# Patient Record
Sex: MALE | Race: WHITE | NOT HISPANIC OR LATINO | Employment: FULL TIME | URBAN - METROPOLITAN AREA
[De-identification: names, ages, dates, MRNs, and addresses within clinical notes are randomized per-mention and may not be internally consistent; named-entity substitution may affect disease eponyms.]

---

## 2023-06-25 PROBLEM — R04.0 EPISTAXIS: Status: ACTIVE | Noted: 2023-06-25

## 2023-06-26 PROBLEM — F10.939 ALCOHOL WITHDRAWAL: Status: ACTIVE | Noted: 2023-06-26

## 2023-06-26 PROBLEM — D69.6 THROMBOCYTOPENIA: Status: ACTIVE | Noted: 2023-06-26

## 2023-06-26 PROBLEM — R79.1 ELEVATED INR: Status: ACTIVE | Noted: 2023-06-26

## 2023-06-26 PROBLEM — D62 ACUTE BLOOD LOSS ANEMIA: Status: ACTIVE | Noted: 2023-06-26

## 2023-06-29 PROBLEM — K74.60 HEPATIC CIRRHOSIS: Status: ACTIVE | Noted: 2023-06-29

## 2023-07-24 ENCOUNTER — TREATMENT (OUTPATIENT)
Dept: PHYSICAL THERAPY | Facility: CLINIC | Age: 53
End: 2023-07-24
Payer: COMMERCIAL

## 2023-07-24 DIAGNOSIS — G89.29 CHRONIC RIGHT-SIDED LOW BACK PAIN WITH RIGHT-SIDED SCIATICA: Primary | ICD-10-CM

## 2023-07-24 DIAGNOSIS — M54.41 CHRONIC RIGHT-SIDED LOW BACK PAIN WITH RIGHT-SIDED SCIATICA: Primary | ICD-10-CM

## 2023-07-24 PROCEDURE — 97110 THERAPEUTIC EXERCISES: CPT | Performed by: PHYSICAL THERAPIST

## 2023-07-24 NOTE — PROGRESS NOTES
Physical Therapy Daily Treatment Note                  [address] [facility] [address]  [place] [address]      Patient: Karthik Otto   : 1970  Diagnosis/ICD-10 Code:  Chronic right-sided low back pain with right-sided sciatica [M54.41, G89.29]  Referring practitioner: Akila Rosario DO  Date of Initial Visit: Type: THERAPY  Noted: 2023  Today's Date: 2023  Patient seen for 2 sessions             Subjective   Karthik Otto reports: no changes in symptoms yet but doing the stretches.     Objective   See Exercise, Manual, and Modality Logs for complete treatment.       Assessment/Plan  Progressed to prone press ups with positive results. Pt noted a decrease in low back stiffness/discomfort. He will continue these daily to add to HEP.  Progress per Plan of Care and Progress strengthening /stabilization /functional activity           Timed:  Manual Therapy:         mins  89547;  Therapeutic Exercise:    30     mins  72105;     Neuromuscular Krupa:        mins  47643;    Therapeutic Activity:          mins  48469;     Gait Training:           mins  54284;     Ultrasound:          mins  43454;    Electrical Stimulation:         mins  64057;  Iontophoresis          mins  28677    Untimed:  Electrical Stimulation:         mins  35957 ( );  Mechanical Traction:         mins  79396;   Fluidotherapy          mins  38127    Timed Treatment:   30   mins   Total Treatment:     30   mins        Jade Sousa PTA  Physical Therapist Assistant

## 2023-07-26 ENCOUNTER — PATIENT MESSAGE (OUTPATIENT)
Dept: FAMILY MEDICINE CLINIC | Facility: CLINIC | Age: 53
End: 2023-07-26
Payer: COMMERCIAL

## 2023-07-26 ENCOUNTER — TREATMENT (OUTPATIENT)
Dept: PHYSICAL THERAPY | Facility: CLINIC | Age: 53
End: 2023-07-26
Payer: COMMERCIAL

## 2023-07-26 DIAGNOSIS — Z87.898 HISTORY OF HEAVY ALCOHOL CONSUMPTION: ICD-10-CM

## 2023-07-26 DIAGNOSIS — M54.41 CHRONIC RIGHT-SIDED LOW BACK PAIN WITH RIGHT-SIDED SCIATICA: Primary | ICD-10-CM

## 2023-07-26 DIAGNOSIS — I10 PRIMARY HYPERTENSION: Primary | ICD-10-CM

## 2023-07-26 DIAGNOSIS — M54.2 PAIN, NECK: ICD-10-CM

## 2023-07-26 DIAGNOSIS — G89.29 CHRONIC RIGHT-SIDED LOW BACK PAIN WITH RIGHT-SIDED SCIATICA: Primary | ICD-10-CM

## 2023-07-26 PROCEDURE — 97110 THERAPEUTIC EXERCISES: CPT | Performed by: PHYSICAL THERAPIST

## 2023-07-26 PROCEDURE — 97530 THERAPEUTIC ACTIVITIES: CPT | Performed by: PHYSICAL THERAPIST

## 2023-07-26 NOTE — PROGRESS NOTES
Physical Therapy Daily Treatment Note                  [address] [facility] [address]  [place] [address]      Patient: Karthik Otto   : 1970  Diagnosis/ICD-10 Code:  Chronic right-sided low back pain with right-sided sciatica [M54.41, G89.29]  Referring practitioner: Akila Rosario DO  Date of Initial Visit: Type: THERAPY  Noted: 2023  Today's Date: 2023  Patient seen for 3 sessions             Subjective   Karthik Otto reports: have some soreness in the R side of shoulder and R buttocks today.   1/10 pain pre treatment  0/10 pain post treatment    Objective   See Exercise, Manual, and Modality Logs for complete treatment.       Assessment/Plan  Able to reach pain free in the neck and back post treatment. Updated HEP to reflect progressions to continue for flexibility and strength.     Access Code: [email or web address]  URL: [email or web address]  Date: 2023  Prepared by: Jade Sousa    Exercises  - Cervical Extension AROM with Strap  - 1 x daily - 7 x weekly - 1 sets - 10 reps  - Cervical Retraction at Wall  - 1 x daily - 7 x weekly - 2 sets - 10 reps  - Gentle Levator Scapulae Stretch (Mirrored)  - 1 x daily - 7 x weekly - 3 sets - 15 sec hold  - Seated Thoracic Lumbar Extension with Pectoralis Stretch  - 1 x daily - 7 x weekly - 1 sets - 10 reps  - Seated Hamstring Stretch  - 1 x daily - 7 x weekly - 5 sets - 15 sec hold  - Seated Figure 4 Piriformis Stretch  - 1 x daily - 7 x weekly - 3 sets - 30 sec hold  - Supine Pelvic Tilt  - 1 x daily - 7 x weekly - 1 sets - 20 reps  - Prone Press Up  - 1 x daily - 7 x weekly - 1 sets - 10 reps  - Bridge  - 1 x daily - 7 x weekly - 2 sets - 10 reps  Progress per Plan of Care and Progress strengthening /stabilization /functional activity           Timed:  Manual Therapy:         mins  07343;  Therapeutic Exercise:    16     mins  93501;     Neuromuscular Krupa:        mins  75779;    Therapeutic Activity:     21      mins  79767;     Gait Training:           mins  33950;     Ultrasound:          mins  48224;    Electrical Stimulation:         mins  91116;  Iontophoresis          mins  59013    Untimed:  Electrical Stimulation:         mins  63807 ( );  Mechanical Traction:         mins  27305;   Fluidotherapy          mins  67926    Timed Treatment:   37   mins   Total Treatment:     37   mins        Jade Sousa PTA  Physical Therapist Assistant

## 2023-07-27 RX ORDER — LANOLIN ALCOHOL/MO/W.PET/CERES
100 CREAM (GRAM) TOPICAL DAILY
Qty: 30 TABLET | Refills: 0 | OUTPATIENT
Start: 2023-07-27

## 2023-07-27 RX ORDER — MULTIPLE VITAMINS W/ MINERALS TAB 9MG-400MCG
1 TAB ORAL DAILY
Qty: 30 EACH | Refills: 11 | Status: SHIPPED | OUTPATIENT
Start: 2023-07-27

## 2023-07-27 RX ORDER — FOLIC ACID 1 MG/1
1 TABLET ORAL DAILY
Qty: 30 TABLET | Refills: 0 | OUTPATIENT
Start: 2023-07-27

## 2023-07-27 RX ORDER — AMLODIPINE BESYLATE 5 MG/1
5 TABLET ORAL DAILY
Qty: 30 TABLET | Refills: 1 | Status: SHIPPED | OUTPATIENT
Start: 2023-07-27

## 2023-07-27 NOTE — TELEPHONE ENCOUNTER
Rx Refill Note  Requested Prescriptions     Pending Prescriptions Disp Refills    folic acid (FOLVITE) 1 MG tablet 30 tablet 0     Sig: Take 1 tablet by mouth Daily.    multivitamin with minerals tablet tablet 30 each 0     Sig: Take 1 tablet by mouth Daily.    thiamine (VITAMIN B1) 100 MG tablet 30 tablet 0     Sig: Take 1 tablet by mouth Daily.    amLODIPine (NORVASC) 5 MG tablet 30 tablet 1     Sig: Take 1 tablet by mouth Daily.      Last office visit with prescribing clinician: 7/3/2023   Next office visit with prescribing clinician: 1/15/2024   Estela Nair MA  07/27/23, 07:37 EDT    Last fill: 06/30/2023

## 2023-07-31 ENCOUNTER — TREATMENT (OUTPATIENT)
Dept: PHYSICAL THERAPY | Facility: CLINIC | Age: 53
End: 2023-07-31
Payer: COMMERCIAL

## 2023-07-31 DIAGNOSIS — M54.41 CHRONIC RIGHT-SIDED LOW BACK PAIN WITH RIGHT-SIDED SCIATICA: Primary | ICD-10-CM

## 2023-07-31 DIAGNOSIS — G89.29 CHRONIC RIGHT-SIDED LOW BACK PAIN WITH RIGHT-SIDED SCIATICA: Primary | ICD-10-CM

## 2023-07-31 DIAGNOSIS — M54.2 PAIN, NECK: ICD-10-CM

## 2023-08-02 ENCOUNTER — TREATMENT (OUTPATIENT)
Dept: PHYSICAL THERAPY | Facility: CLINIC | Age: 53
End: 2023-08-02
Payer: COMMERCIAL

## 2023-08-02 DIAGNOSIS — M54.41 CHRONIC RIGHT-SIDED LOW BACK PAIN WITH RIGHT-SIDED SCIATICA: Primary | ICD-10-CM

## 2023-08-02 DIAGNOSIS — G89.29 CHRONIC RIGHT-SIDED LOW BACK PAIN WITH RIGHT-SIDED SCIATICA: Primary | ICD-10-CM

## 2023-08-02 DIAGNOSIS — M54.2 PAIN, NECK: ICD-10-CM

## 2023-08-02 PROCEDURE — 97112 NEUROMUSCULAR REEDUCATION: CPT | Performed by: PHYSICAL THERAPIST

## 2023-08-02 PROCEDURE — 97110 THERAPEUTIC EXERCISES: CPT | Performed by: PHYSICAL THERAPIST

## 2023-08-07 ENCOUNTER — TREATMENT (OUTPATIENT)
Dept: PHYSICAL THERAPY | Facility: CLINIC | Age: 53
End: 2023-08-07
Payer: COMMERCIAL

## 2023-08-07 DIAGNOSIS — M54.2 PAIN, NECK: ICD-10-CM

## 2023-08-07 DIAGNOSIS — G89.29 CHRONIC RIGHT-SIDED LOW BACK PAIN WITH RIGHT-SIDED SCIATICA: Primary | ICD-10-CM

## 2023-08-07 DIAGNOSIS — M54.41 CHRONIC RIGHT-SIDED LOW BACK PAIN WITH RIGHT-SIDED SCIATICA: Primary | ICD-10-CM

## 2023-08-07 PROCEDURE — 97110 THERAPEUTIC EXERCISES: CPT | Performed by: PHYSICAL THERAPIST

## 2023-08-07 PROCEDURE — 97530 THERAPEUTIC ACTIVITIES: CPT | Performed by: PHYSICAL THERAPIST

## 2023-08-07 NOTE — PROGRESS NOTES
Physical Therapy Daily Treatment Note                  [address] [facility] [address]  [place] [address]      Patient: Karthik Otto   : 1970  Diagnosis/ICD-10 Code:  Chronic right-sided low back pain with right-sided sciatica [M54.41, G89.29]  Referring practitioner: Akila Rosario DO  Date of Initial Visit: Type: THERAPY  Noted: 2023  Today's Date: 2023  Patient seen for 6 sessions             Subjective   Karthik Otto reports: able to do a lot of yard work pulling bushes over 2 acres this weekend. Did not have much discomfort during the activity but afterwards got sore in the muscles and tightened up causing some discomfort in the right side of the neck. 90-95% back to normal with therapy.     Objective   See Exercise, Manual, and Modality Logs for complete treatment.       Assessment/Plan  Endurance of stabilizers are the focus in treatment with thoracic mobility. Pt was able to complete puling/pushing/lifting activities pain free during activities, but had a sore/stiff neck after for a little bit. Expect the discomfort post heavy activities to decrease with strengthening. Updated HEP to work more on strength/thoracic mobility.     Access Code: [email or web address]  URL: [email or web address]  Date: 2023  Prepared by: Jade Sousa    Exercises  - Seated Thoracic Lumbar Extension with Pectoralis Stretch  - 1 x daily - 7 x weekly - 1 sets - 15 reps  - Sidelying Thoracic Rotation with Open Book  - 1 x daily - 7 x weekly - 1 sets - 10 reps  - Prone Shoulder Horizontal Abduction  - 1 x daily - 7 x weekly - 2 sets - 10 reps  - Prone Single Arm Shoulder Y  - 1 x daily - 7 x weekly - 2 sets - 10 reps  - Prone Shoulder Extension - Single Arm  - 1 x daily - 7 x weekly - 2 sets - 10 reps  - Quadruped Cervical Retraction  - 1 x daily - 7 x weekly - 2 sets - 5 reps  Progress per Plan of Care and Progress strengthening /stabilization /functional activity            Timed:  Manual Therapy:         mins  18081;  Therapeutic Exercise:    30     mins  36095;     Neuromuscular Krupa:        mins  00299;    Therapeutic Activity:     15     mins  98375;     Gait Training:           mins  46323;     Ultrasound:          mins  75405;    Electrical Stimulation:         mins  35052;  Iontophoresis          mins  80504    Untimed:  Electrical Stimulation:         mins  24047 ( );  Mechanical Traction:         mins  01768;   Fluidotherapy          mins  49300    Timed Treatment:   45   mins   Total Treatment:     45   mins        Jade Sousa PTA  Physical Therapist Assistant

## 2023-08-09 ENCOUNTER — TELEPHONE (OUTPATIENT)
Dept: PHYSICAL THERAPY | Facility: CLINIC | Age: 53
End: 2023-08-09

## 2023-08-14 ENCOUNTER — TREATMENT (OUTPATIENT)
Dept: PHYSICAL THERAPY | Facility: CLINIC | Age: 53
End: 2023-08-14
Payer: COMMERCIAL

## 2023-08-14 DIAGNOSIS — M54.41 CHRONIC RIGHT-SIDED LOW BACK PAIN WITH RIGHT-SIDED SCIATICA: Primary | ICD-10-CM

## 2023-08-14 DIAGNOSIS — M54.2 PAIN, NECK: ICD-10-CM

## 2023-08-14 DIAGNOSIS — G89.29 CHRONIC RIGHT-SIDED LOW BACK PAIN WITH RIGHT-SIDED SCIATICA: Primary | ICD-10-CM

## 2023-08-14 PROCEDURE — 97110 THERAPEUTIC EXERCISES: CPT | Performed by: PHYSICAL THERAPIST

## 2023-08-14 NOTE — PROGRESS NOTES
Physical Therapy Daily Treatment Note                  [address] [facility] [address]  [place] [address]      Patient: Karthik Otto   : 1970  Diagnosis/ICD-10 Code:  Chronic right-sided low back pain with right-sided sciatica [M54.41, G89.29]  Referring practitioner: Akila Rosario DO  Date of Initial Visit: Type: THERAPY  Noted: 2023  Today's Date: 2023  Patient seen for 7 sessions             Subjective   Karthik Otto reports: did 12 hours of yard work yesterday and the neck/shoulder di pretty good. He is sore today but muscular sore.     Objective   See Exercise, Manual, and Modality Logs for complete treatment.       Assessment/Plan  Pt was able to complete lifting/pushing/pulling activities in the yard with no negative effects. Will updated HEP next visit for progressions.   Progress per Plan of Care and Progress strengthening /stabilization /functional activity           Timed:  Manual Therapy:         mins  80759;  Therapeutic Exercise:    33     mins  57171;     Neuromuscular Krupa:        mins  83150;    Therapeutic Activity:          mins  19829;     Gait Training:           mins  12586;     Ultrasound:          mins  80450;    Electrical Stimulation:         mins  52459;  Iontophoresis          mins  74579    Untimed:  Electrical Stimulation:         mins  77028 ( );  Mechanical Traction:         mins  17277;   Fluidotherapy          mins  24443    Timed Treatment:   33   mins   Total Treatment:     33   mins        Jade Sousa PTA  Physical Therapist Assistant

## 2023-08-18 DIAGNOSIS — I10 PRIMARY HYPERTENSION: ICD-10-CM

## 2023-08-18 RX ORDER — AMLODIPINE BESYLATE 5 MG/1
TABLET ORAL
Qty: 30 TABLET | Refills: 1 | Status: SHIPPED | OUTPATIENT
Start: 2023-08-18

## 2023-08-18 NOTE — TELEPHONE ENCOUNTER
Rx Refill Note  Requested Prescriptions     Pending Prescriptions Disp Refills    amLODIPine (NORVASC) 5 MG tablet [Pharmacy Med Name: AMLODIPINE BESYLATE 5 MG TAB] 30 tablet 1     Sig: TAKE 1 TABLET BY MOUTH EVERY DAY      Last office visit with prescribing clinician: 7/3/2023   Last telemedicine visit with prescribing clinician: Visit date not found   Next office visit with prescribing clinician: 1/15/2024                         Would you like a call back once the refill request has been completed: [] Yes [] No    If the office needs to give you a call back, can they leave a voicemail: [] Yes [] No    Sami Duke MA  08/18/23, 12:50 EDT

## 2023-08-28 DIAGNOSIS — I10 PRIMARY HYPERTENSION: ICD-10-CM

## 2023-08-28 RX ORDER — AMLODIPINE BESYLATE 5 MG/1
5 TABLET ORAL DAILY
Qty: 30 TABLET | Refills: 1 | OUTPATIENT
Start: 2023-08-28

## 2023-08-28 NOTE — TELEPHONE ENCOUNTER
Caller: Karthik Otto    Relationship: Self    Best call back number: [phone]    Requested Prescriptions:   Requested Prescriptions     Pending Prescriptions Disp Refills    amLODIPine (NORVASC) 5 MG tablet 30 tablet 1     Sig: Take 1 tablet by mouth Daily.        Pharmacy where request should be sent:    [facility] [phone]  Last office visit with prescribing clinician: 7/3/2023   Last telemedicine visit with prescribing clinician: Visit date not found   Next office visit with prescribing clinician: 1/15/2024     Additional details provided by patient: PATIENT IS OUT OF MEDICATION AND INSURANCE WILL ONLY PAY FOR A 90 DAY SUPPLY. PLEASE SEND IN A 90 DAY SUPPLY.     Does the patient have less than a 3 day supply:  [x] Yes  [] No    Would you like a call back once the refill request has been completed: [x] Yes [] No    If the office needs to give you a call back, can they leave a voicemail: [x] Yes [] No    Alexys Ramos Rep   08/28/23 13:31 EDT

## 2023-08-30 DIAGNOSIS — I10 PRIMARY HYPERTENSION: ICD-10-CM

## 2023-08-31 ENCOUNTER — PATIENT MESSAGE (OUTPATIENT)
Dept: FAMILY MEDICINE CLINIC | Facility: CLINIC | Age: 53
End: 2023-08-31
Payer: COMMERCIAL

## 2023-08-31 DIAGNOSIS — I10 PRIMARY HYPERTENSION: ICD-10-CM

## 2023-08-31 RX ORDER — AMLODIPINE BESYLATE 5 MG/1
5 TABLET ORAL DAILY
Qty: 30 TABLET | Refills: 1 | OUTPATIENT
Start: 2023-08-31

## 2023-08-31 RX ORDER — AMLODIPINE BESYLATE 5 MG/1
5 TABLET ORAL DAILY
Qty: 90 TABLET | Refills: 0 | Status: SHIPPED | OUTPATIENT
Start: 2023-08-31

## 2023-09-01 ENCOUNTER — OFFICE VISIT (OUTPATIENT)
Dept: GASTROENTEROLOGY | Facility: CLINIC | Age: 53
End: 2023-09-01
Payer: COMMERCIAL

## 2023-09-01 VITALS
WEIGHT: 183.4 LBS | SYSTOLIC BLOOD PRESSURE: 130 MMHG | DIASTOLIC BLOOD PRESSURE: 70 MMHG | TEMPERATURE: 98.4 F | HEIGHT: 66 IN | BODY MASS INDEX: 29.47 KG/M2 | HEART RATE: 69 BPM

## 2023-09-01 DIAGNOSIS — R79.1 PROLONGED INR: ICD-10-CM

## 2023-09-01 DIAGNOSIS — E55.9 VITAMIN D DEFICIENCY: ICD-10-CM

## 2023-09-01 DIAGNOSIS — K70.30 ALCOHOLIC CIRRHOSIS OF LIVER WITHOUT ASCITES: Primary | ICD-10-CM

## 2023-09-01 DIAGNOSIS — R74.8 ABNORMAL LIVER ENZYMES: ICD-10-CM

## 2023-09-01 DIAGNOSIS — Z23 ENCOUNTER FOR IMMUNIZATION: ICD-10-CM

## 2023-09-01 DIAGNOSIS — Z12.11 SCREEN FOR COLON CANCER: ICD-10-CM

## 2023-09-01 DIAGNOSIS — Z78.9 IMMUNE TO HEPATITIS A: ICD-10-CM

## 2023-09-01 PROCEDURE — 90471 IMMUNIZATION ADMIN: CPT | Performed by: NURSE PRACTITIONER

## 2023-09-01 PROCEDURE — 90746 HEPB VACCINE 3 DOSE ADULT IM: CPT | Performed by: NURSE PRACTITIONER

## 2023-09-01 PROCEDURE — 99214 OFFICE O/P EST MOD 30 MIN: CPT | Performed by: NURSE PRACTITIONER

## 2023-09-01 NOTE — PROGRESS NOTES
GASTROENTEROLOGY OFFICE NOTE    Karthik Otto  [id number]  1970    CARE TEAM  Patient Care Team:  Akila Fernandez DO as PCP - General (Internal Medicine)  Rahul Morris MD as Consulting Physician (Hematology and Oncology)    Referring Provider: Petrona Boucher PA    Chief Complaint   Patient presents with    Hepatic Disease        HISTORY OF PRESENT ILLNESS:   Karthik Otto is a 53 y.o. male who presents to the clinic today for [facility] admission follow up.     He was admitted 2023 to 2023 due to nosebleed, found to have hypertension, alcohol abuse, thrombocytopenia, abnormal imaging suggestive of cirrhosis.  He was given platelets, fresh frozen plasma, vitamin K.  INR prolonged.  It was documented he was given hepatitis B vaccine prior to discharge, Hepatitis B vaccine given 2023.  Alcohol cessation recommended.    2023 ultrasound abdomen revealed overall heterogenous and slightly nodular appearance of the liver that may reflect underlying cirrhosis, hyperechoic appearance of the liver suggestive of steatosis, questionable focal hyperechoic focus along the hepatic dome may be artifactual but could be further evaluated with nonemergent CT or MRI liver protocol, heterogenous appearance of the pancreas that is nonspecific.    2023 CT scan of the abdomen with and without contrast revealed cirrhotic appearing liver, hypertrophied/lobulated caudate lobe noted, borderline splenomegaly.    No known recurrent nose bleed. Labs checked approximately 2 months ago with improvement in hemoglobin, hematocrit, platelet count, liver enzymes elevated.     He drank 2 glasses of wine last night following his father's . He reports increased intake of alcohol over the past few years but recent decrease in alcohol intake.     He has not had prior colorectal cancer screening test nor colonoscopy      Past Medical History:   Diagnosis Date    Allergic     Anemia 23    Clotting  "disorder 23    Headache     Hyperlipidemia     Hypertension     Liver disease 23    Substance abuse         History reviewed. No pertinent surgical history.     Current Outpatient Medications on File Prior to Visit   Medication Sig    amLODIPine (NORVASC) 5 MG tablet Take 1 tablet by mouth Daily.    multivitamin with minerals tablet tablet Take 1 tablet by mouth Daily.     No current facility-administered medications on file prior to visit.       No Known Allergies    Family History   Problem Relation Age of Onset    Vision loss Mother     Cancer Father     Heart disease Father     Hyperlipidemia Father     Alcohol abuse Brother         40 yrs in AA    Asthma Brother     Hyperlipidemia Brother     Alcohol abuse Brother         recent    Drug abuse Brother     Cancer Paternal Grandmother     Colon cancer Neg Hx        Social History     Socioeconomic History    Marital status:    Tobacco Use    Smoking status: Former     Packs/day: 1.00     Years: 4.00     Pack years: 4.00     Types: Cigarettes     Quit date: 2011     Years since quittin.6    Smokeless tobacco: Never   Vaping Use    Vaping Use: Never used   Substance and Sexual Activity    Alcohol use: Not Currently     Alcohol/week: 60.0 standard drinks     Types: 20 Glasses of wine, 40 Drinks containing 0.5 oz of alcohol per week     Comment: Stopped 23    Drug use: Never    Sexual activity: Not Currently     Partners: Female     Comment: Wife uses pill       PHYSICAL EXAM   /70 (BP Location: Left arm, Patient Position: Sitting, Cuff Size: Adult)   Pulse 69   Temp 98.4 °F (36.9 °C) (Temporal)   Ht 167.6 cm (65.98\")   Wt 83.2 kg (183 lb 6.4 oz)   BMI 29.62 kg/m²   Physical Exam  Constitutional:       General: He is not in acute distress.     Appearance: He is not toxic-appearing.   HENT:      Head: Normocephalic and atraumatic. No contusion.      Right Ear: External ear normal.      Left Ear: External ear normal.   Eyes:     "  General: Lids are normal. No scleral icterus.        Right eye: No discharge.         Left eye: No discharge.      Extraocular Movements: Extraocular movements intact.   Neck:      Trachea: Trachea normal.      Comments: No visible mass  No visible adenopathy  Cardiovascular:      Rate and Rhythm: Normal rate.   Pulmonary:      Effort: No respiratory distress.      Comments: Symmetrical expansion    Abdominal:      Palpations: Abdomen is soft. There is no mass.      Tenderness: There is no abdominal tenderness.   Musculoskeletal:      Right lower leg: No edema.      Left lower leg: No edema.      Comments: Symmetrical movement of upper extremities  Symmetrical movement of lower extremities  No visible deformities   Skin:     General: Skin is warm and dry.      Coloration: Skin is not jaundiced.   Neurological:      General: No focal deficit present.      Mental Status: He is alert and oriented to person, place, and time.   Psychiatric:         Mood and Affect: Mood normal.         Behavior: Behavior normal.         Thought Content: Thought content normal.   Results Review:  6/25/2023 ultrasound abdomen revealed overall heterogenous and slightly nodular appearance of the liver that may reflect underlying cirrhosis, hyperechoic appearance of the liver suggestive of steatosis, questionable focal hyperechoic focus along the hepatic dome may be artifactual but could be further evaluated with nonemergent CT or MRI liver protocol, heterogenous appearance of the pancreas that is nonspecific.    6/27/2023 CT scan of the abdomen with and without contrast revealed cirrhotic appearing liver, hypertrophied/lobulated caudate lobe noted, borderline splenomegaly.  CMP          6/27/2023    05:59 6/27/2023    20:09 6/28/2023    04:41 7/3/2023    09:54   CMP   Glucose 88   90  98    BUN 17   9  9    Creatinine 0.82   0.78  0.90    EGFR 106.4   108.0  103.4    Sodium 138   134  138    Potassium 3.5  4.1  4.2  4.2    Chloride 104    104  103    Calcium 8.3   8.4  9.7    Total Protein 5.9   6.4  7.5    Albumin 3.2   3.0  3.7    Globulin 2.7   3.4  3.8    Total Bilirubin 1.9   1.3  1.0    Alkaline Phosphatase 60   67  91    AST (SGOT) 47   52  86    ALT (SGPT) 25   29  50    Albumin/Globulin Ratio 1.2   0.9  1.0    BUN/Creatinine Ratio 20.7   11.5  10.0    Anion Gap 14.0   11.0  13.0      CBC          6/27/2023    05:59 6/28/2023    04:42 7/3/2023    09:54   CBC   WBC 6.58  6.41  6.88    RBC 2.46  2.44  3.01    Hemoglobin 8.2  8.3  10.1    Hematocrit 26.2  24.8  31.0    .5  101.6  103.0    MCH 33.3  34.0  33.6    MCHC 31.3  33.5  32.6    RDW 13.5  13.2  13.9    Platelets 67  78  187      Lipid Panel          7/3/2023    09:54   Lipid Panel   Total Cholesterol 193    Triglycerides 105    HDL Cholesterol 37    VLDL Cholesterol 19    LDL Cholesterol  137      TSH          7/3/2023    09:54   TSH   TSH 3.480      INR          6/25/2023    06:13 6/26/2023    06:44 6/27/2023    05:59   Common Labsle   INR 1.28  1.27  1.26      Iron   Date Value Ref Range Status   07/03/2023 34 (L) 59 - 158 mcg/dL Final     Iron Saturation (TSAT)   Date Value Ref Range Status   07/03/2023 11 (L) 20 - 50 % Final     Transferrin   Date Value Ref Range Status   07/03/2023 207 200 - 360 mg/dL Final     TIBC   Date Value Ref Range Status   07/03/2023 308 298 - 536 mcg/dL Final     Ferritin   Date Value Ref Range Status   07/03/2023 288.00 30.00 - 400.00 ng/mL Final      Folate   Date Value Ref Range Status   07/03/2023 19.40 4.78 - 24.20 ng/mL Final     Vitamin B-12   Date Value Ref Range Status   07/03/2023 1,024 (H) 211 - 946 pg/mL Final      Hepatitis B Surface Ab Quant   Date Value Ref Range Status   06/26/2023 <3.1 (L) Immunity>9.9 mIU/mL Final     Comment:       Status of Immunity                     Anti-HBs Level    ------------------                     --------------  Inconsistent with Immunity                   0.0 - 9.9  Consistent with Immunity                           >9.9          Component  Ref Range & Units 2 mo ago   25 Hydroxy, Vitamin D  30.0 - 100.0 ng/ml 21.7 Low        ASSESSMENT / PLAN  1. Alcoholic cirrhosis of liver without ascites  2. Prolonged INR  3. Elevated liver enzymes  Presumed cirrhosis due to abnormal imaging and labs 6/2023, Avoid alcohol, offered resources for assistance to help with abstaining from alcohol intake but he declined at this time  MELD Na 12  calculated during hospital admission 6/2023, recheck labs no sooner than 2 weeks from now and calculate MELD Na score  US q 6 months for HCC screening  next due 12/2023  No Ascites nor Mass identified on CT scan 6/2023 and AFP was normal  No overt HE   EGD to screen for varices due, will schedule  CRC screen due, will schedule  Immune to Hepatitis A, receiving B vaccine as below  Recommend daily protein intake of 110 grams per day  Limit salt/sodium intake to no more than 2 grams or 2,000 mg per day.   Avoid NSAIDs such as ibuprofen, Advil, Motrin, diclofenac, meloxicam, naproxen. Limit use of acetaminophen to no more than 2,000 mg per day (patient may take acetaminophen 500 mg q 6 hours as needed for pain).  Avoid alcohol, tobacco and raw shellfish  - printed information regarding cirrhosis management provided  - CBC (No Diff); Future  - Comprehensive Metabolic Panel; Future  - Protime-INR; Future    4. Immune to hepatitis A    5. Encounter for immunization  - first hepatitis B vaccine given during hospital admission 6/2023, second B vaccine given today, 9/1/2023, third vaccine due 12/2023  - Hepatitis B Vaccine Adult IM    6. Screen for colon cancer  - schedule initial screening colonoscopy  - Ambulatory Referral For Screening Colonoscopy  7. Vitamin D deficiency  Vitamin D level low 6/2023, recommend D intake of 1000 IU per day with calcium intake of 1000 mg per day via diet and supplement      Return in about 3 months (around 12/1/2023) for Follow-up after Colonoscopy, Follow-up after  EGD.    Yari Bennett, APRN  09/01/2023

## 2023-09-13 ENCOUNTER — LAB (OUTPATIENT)
Dept: LAB | Facility: HOSPITAL | Age: 53
End: 2023-09-13
Payer: COMMERCIAL

## 2023-09-13 DIAGNOSIS — K70.30 ALCOHOLIC CIRRHOSIS OF LIVER WITHOUT ASCITES: ICD-10-CM

## 2023-09-13 LAB
ALBUMIN SERPL-MCNC: 4 G/DL (ref 3.5–5.2)
ALBUMIN/GLOB SERPL: 1.1 G/DL
ALP SERPL-CCNC: 79 U/L (ref 39–117)
ALT SERPL W P-5'-P-CCNC: 17 U/L (ref 1–41)
ANION GAP SERPL CALCULATED.3IONS-SCNC: 13.2 MMOL/L (ref 5–15)
AST SERPL-CCNC: 32 U/L (ref 1–40)
BILIRUB SERPL-MCNC: 0.6 MG/DL (ref 0–1.2)
BUN SERPL-MCNC: 11 MG/DL (ref 6–20)
BUN/CREAT SERPL: 10.9 (ref 7–25)
CALCIUM SPEC-SCNC: 10.1 MG/DL (ref 8.6–10.5)
CHLORIDE SERPL-SCNC: 103 MMOL/L (ref 98–107)
CO2 SERPL-SCNC: 22.8 MMOL/L (ref 22–29)
CREAT SERPL-MCNC: 1.01 MG/DL (ref 0.76–1.27)
DEPRECATED RDW RBC AUTO: 39.1 FL (ref 37–54)
EGFRCR SERPLBLD CKD-EPI 2021: 90 ML/MIN/1.73
ERYTHROCYTE [DISTWIDTH] IN BLOOD BY AUTOMATED COUNT: 12.6 % (ref 12.3–15.4)
GLOBULIN UR ELPH-MCNC: 3.5 GM/DL
GLUCOSE SERPL-MCNC: 101 MG/DL (ref 65–99)
HCT VFR BLD AUTO: 37.6 % (ref 37.5–51)
HGB BLD-MCNC: 12.7 G/DL (ref 13–17.7)
INR PPP: 1.13 (ref 0.89–1.12)
MCH RBC QN AUTO: 29.2 PG (ref 26.6–33)
MCHC RBC AUTO-ENTMCNC: 33.8 G/DL (ref 31.5–35.7)
MCV RBC AUTO: 86.4 FL (ref 79–97)
PLATELET # BLD AUTO: 125 10*3/MM3 (ref 140–450)
PMV BLD AUTO: 12.3 FL (ref 6–12)
POTASSIUM SERPL-SCNC: 4.3 MMOL/L (ref 3.5–5.2)
PROT SERPL-MCNC: 7.5 G/DL (ref 6–8.5)
PROTHROMBIN TIME: 14.6 SECONDS (ref 12.2–14.5)
RBC # BLD AUTO: 4.35 10*6/MM3 (ref 4.14–5.8)
SODIUM SERPL-SCNC: 139 MMOL/L (ref 136–145)
WBC NRBC COR # BLD: 6.05 10*3/MM3 (ref 3.4–10.8)

## 2023-09-13 PROCEDURE — 85610 PROTHROMBIN TIME: CPT

## 2023-09-13 PROCEDURE — 80053 COMPREHEN METABOLIC PANEL: CPT

## 2023-09-13 PROCEDURE — 85027 COMPLETE CBC AUTOMATED: CPT

## 2023-11-01 RX ORDER — SODIUM, POTASSIUM,MAG SULFATES 17.5-3.13G
2 SOLUTION, RECONSTITUTED, ORAL ORAL TAKE AS DIRECTED
Qty: 354 ML | Refills: 0 | Status: SHIPPED | OUTPATIENT
Start: 2023-11-01

## 2023-11-15 ENCOUNTER — OUTSIDE FACILITY SERVICE (OUTPATIENT)
Dept: GASTROENTEROLOGY | Facility: CLINIC | Age: 53
End: 2023-11-15
Payer: COMMERCIAL

## 2023-11-15 PROCEDURE — 43235 EGD DIAGNOSTIC BRUSH WASH: CPT | Performed by: INTERNAL MEDICINE

## 2023-11-15 PROCEDURE — 45385 COLONOSCOPY W/LESION REMOVAL: CPT | Performed by: INTERNAL MEDICINE

## 2023-11-22 DIAGNOSIS — I10 PRIMARY HYPERTENSION: ICD-10-CM

## 2023-11-22 RX ORDER — AMLODIPINE BESYLATE 5 MG/1
5 TABLET ORAL DAILY
Qty: 90 TABLET | Refills: 0 | OUTPATIENT
Start: 2023-11-22

## 2023-11-22 NOTE — TELEPHONE ENCOUNTER
Rx Refill Note  Requested Prescriptions     Pending Prescriptions Disp Refills    amLODIPine (NORVASC) 5 MG tablet [Pharmacy Med Name: AMLODIPINE BESYLATE 5 MG TAB] 90 tablet 0     Sig: TAKE 1 TABLET BY MOUTH EVERY DAY      Last office visit with prescribing clinician: 7/3/2023   Last telemedicine visit with prescribing clinician: Visit date not found   Next office visit with prescribing clinician: 1/15/2024                         Would you like a call back once the refill request has been completed: [] Yes [] No    If the office needs to give you a call back, can they leave a voicemail: [] Yes [] No    April RACHEL Casillas  11/22/23, 08:34 EST

## 2023-12-29 ENCOUNTER — OFFICE VISIT (OUTPATIENT)
Dept: GASTROENTEROLOGY | Facility: CLINIC | Age: 53
End: 2023-12-29
Payer: COMMERCIAL

## 2023-12-29 VITALS
SYSTOLIC BLOOD PRESSURE: 140 MMHG | BODY MASS INDEX: 28.61 KG/M2 | DIASTOLIC BLOOD PRESSURE: 60 MMHG | HEIGHT: 66 IN | WEIGHT: 178 LBS | HEART RATE: 70 BPM

## 2023-12-29 DIAGNOSIS — Z86.010 HISTORY OF COLONIC POLYPS: ICD-10-CM

## 2023-12-29 DIAGNOSIS — R79.1 PROLONGED INR: ICD-10-CM

## 2023-12-29 DIAGNOSIS — D69.6 THROMBOCYTOPENIA: ICD-10-CM

## 2023-12-29 DIAGNOSIS — Z78.9 IMMUNE TO HEPATITIS A: ICD-10-CM

## 2023-12-29 DIAGNOSIS — K44.9 HIATAL HERNIA: ICD-10-CM

## 2023-12-29 DIAGNOSIS — K22.2 ESOPHAGEAL STENOSIS: ICD-10-CM

## 2023-12-29 DIAGNOSIS — K70.30 ALCOHOLIC CIRRHOSIS OF LIVER WITHOUT ASCITES: Primary | ICD-10-CM

## 2023-12-29 DIAGNOSIS — I85.10 SECONDARY ESOPHAGEAL VARICES WITHOUT BLEEDING: ICD-10-CM

## 2023-12-29 DIAGNOSIS — Z23 ENCOUNTER FOR IMMUNIZATION: ICD-10-CM

## 2023-12-29 NOTE — Clinical Note
Tanika Fernandez, Due to grade 1 varices identified during EGD per Dr. Tyler, I would like to start patient on carvedilol BID but would like your opinion on if patient should stop amlodipine and start carvedilol or take both medications or if there is another nonselective BB you would like for me to consider? Thanks for your help!

## 2023-12-29 NOTE — PROGRESS NOTES
GASTROENTEROLOGY OFFICE NOTE    Karthik Otto  [id number]  1970    CARE TEAM  Patient Care Team:  Akila Fernandez DO as PCP - General (Internal Medicine)  Rahul Morris MD as Consulting Physician (Hematology and Oncology)    Referring Provider: No ref. provider found    Chief Complaint   Patient presents with    Cirrhosis     3mth follow up.         HISTORY OF PRESENT ILLNESS:   Karthik Otto is a 53 y.o. male Who returns for 3 to 4-month follow-up regarding prior abnormal imaging suggestive of cirrhosis, thrombocytopenia, history of nosebleed, hypertension, alcohol abuse.  At last office visit, labs ordered for additional evaluation regarding suspected cirrhosis.   He was given his second B vaccine third B vaccine due today.  EGD due to cirrhosis and initial screening colonoscopy scheduled.  He is taking vitamin D and calcium supplements due to vitamin D deficiency.     11/15/2023 EGD and colonoscopy per Dr. Tyler.  EGD revealed grade 1 varices in the lower third of esophagus described as small, distal esophageal stenosis, small hiatal hernia.  Colonoscopy revealed 9 polyps measuring 3 to 10 mm in size.  Sigmoid colon polyps tubular adenoma, transverse colon polyps tubular adenoma and ascending colon polyp sessile serrated adenoma.  Repeat colonoscopy recommended in 3 years.    He reports having a taste of wine recently but has otherwise been avoiding alcohol.     He denies difficulty swallowing, reflux, abdominal pain. He is without GI complaints today.   Past Medical History:   Diagnosis Date    Allergic     Anemia 6/25/23    Clotting disorder 6/22/23    Headache     Hyperlipidemia     Hypertension     Liver disease 6/25/23    Substance abuse         Past Surgical History:   Procedure Laterality Date    COLONOSCOPY  11/15/2023    Dr. Tyler    UPPER GASTROINTESTINAL ENDOSCOPY  11/15/2023    Dr. Tyler        Current Outpatient Medications on File Prior to Visit  "  Medication Sig    amLODIPine (NORVASC) 5 MG tablet Take 1 tablet by mouth Daily.    multivitamin with minerals tablet tablet Take 1 tablet by mouth Daily.     No current facility-administered medications on file prior to visit.       No Known Allergies    Family History   Problem Relation Age of Onset    Vision loss Mother     Cancer Father     Heart disease Father     Hyperlipidemia Father     Alcohol abuse Brother         40 yrs in AA    Asthma Brother     Hyperlipidemia Brother     Alcohol abuse Brother         recent    Drug abuse Brother     Cancer Paternal Grandmother     Colon cancer Neg Hx        Social History     Socioeconomic History    Marital status:    Tobacco Use    Smoking status: Former     Packs/day: 1.00     Years: 4.00     Additional pack years: 0.00     Total pack years: 4.00     Types: Cigarettes     Quit date: 2011     Years since quittin.0    Smokeless tobacco: Never   Vaping Use    Vaping Use: Never used   Substance and Sexual Activity    Alcohol use: Not Currently     Alcohol/week: 60.0 standard drinks of alcohol     Types: 20 Glasses of wine, 40 Drinks containing 0.5 oz of alcohol per week     Comment: Stopped 23    Drug use: Never    Sexual activity: Not Currently     Partners: Female     Comment: Wife uses pill       PHYSICAL EXAM   /60 (BP Location: Left arm, Patient Position: Sitting, Cuff Size: Adult)   Pulse 70   Ht 167.6 cm (66\")   Wt 80.7 kg (178 lb)   BMI 28.73 kg/m²   Physical Exam  Constitutional:       General: He is not in acute distress.     Appearance: He is not toxic-appearing.   HENT:      Head: Normocephalic and atraumatic. No contusion.      Right Ear: External ear normal.      Left Ear: External ear normal.   Eyes:      General: Lids are normal. No scleral icterus.        Right eye: No discharge.         Left eye: No discharge.      Extraocular Movements: Extraocular movements intact.   Neck:      Trachea: Trachea normal.      Comments: " No visible mass  No visible adenopathy  Cardiovascular:      Rate and Rhythm: Normal rate.   Pulmonary:      Effort: No respiratory distress.      Comments: Symmetrical expansion    Abdominal:      Palpations: Abdomen is soft. There is no mass.      Tenderness: There is no abdominal tenderness.   Musculoskeletal:      Right lower leg: No edema.      Left lower leg: No edema.      Comments: Symmetrical movement of upper extremities  Symmetrical movement of lower extremities  No visible deformities   Skin:     General: Skin is warm and dry.      Coloration: Skin is not jaundiced.   Neurological:      General: No focal deficit present.      Mental Status: He is alert and oriented to person, place, and time.   Psychiatric:         Mood and Affect: Mood normal.         Behavior: Behavior normal.         Thought Content: Thought content normal.     Results Review:  6/25/2023 ultrasound abdomen revealed overall heterogenous and slightly nodular appearance of the liver that may reflect underlying cirrhosis, hyperechoic appearance of the liver suggestive of steatosis, questionable focal hyperechoic focus along the hepatic dome may be artifactual but could be further evaluated with nonemergent CT or MRI liver protocol, heterogenous appearance of the pancreas that is nonspecific.     6/27/2023 CT scan of the abdomen with and without contrast revealed cirrhotic appearing liver, hypertrophied/lobulated caudate lobe noted, borderline splenomegaly.    11/15/2023 EGD and colonoscopy per Dr. Tyler.  EGD revealed grade 1 varices in the lower third of esophagus described as small, distal esophageal stenosis, small hiatal hernia.  Colonoscopy revealed 9 polyps measuring 3 to 10 mm in size.  Sigmoid colon polyps tubular adenoma, transverse colon polyps tubular adenoma and ascending colon polyp sessile serrated adenoma.  Repeat colonoscopy recommended in 3 years.  CMP          6/28/2023    04:41 7/3/2023    09:54 9/13/2023    14:59    CMP   Glucose 90  98  101    BUN 9  9  11    Creatinine 0.78  0.90  1.01    EGFR 108.0  103.4  90.0    Sodium 134  138  139    Potassium 4.2  4.2  4.3    Chloride 104  103  103    Calcium 8.4  9.7  10.1    Total Protein 6.4  7.5  7.5    Albumin 3.0  3.7  4.0    Globulin 3.4  3.8  3.5    Total Bilirubin 1.3  1.0  0.6    Alkaline Phosphatase 67  91  79    AST (SGOT) 52  86  32    ALT (SGPT) 29  50  17    Albumin/Globulin Ratio 0.9  1.0  1.1    BUN/Creatinine Ratio 11.5  10.0  10.9    Anion Gap 11.0  13.0  13.2      CBC          6/28/2023    04:42 7/3/2023    09:54 9/13/2023    14:59   CBC   WBC 6.41  6.88  6.05    RBC 2.44  3.01  4.35    Hemoglobin 8.3  10.1  12.7    Hematocrit 24.8  31.0  37.6    .6  103.0  86.4    MCH 34.0  33.6  29.2    MCHC 33.5  32.6  33.8    RDW 13.2  13.9  12.6    Platelets 78  187  125      INR          6/25/2023    06:13 6/26/2023    06:44 6/27/2023    05:59 9/13/2023    14:59   Common Labsle   INR 1.28  1.27  1.26  1.13        Iron   Date Value Ref Range Status   07/03/2023 34 (L) 59 - 158 mcg/dL Final     Iron Saturation (TSAT)   Date Value Ref Range Status   07/03/2023 11 (L) 20 - 50 % Final     Transferrin   Date Value Ref Range Status   07/03/2023 207 200 - 360 mg/dL Final     TIBC   Date Value Ref Range Status   07/03/2023 308 298 - 536 mcg/dL Final     Ferritin   Date Value Ref Range Status   07/03/2023 288.00 30.00 - 400.00 ng/mL Final      Folate   Date Value Ref Range Status   07/03/2023 19.40 4.78 - 24.20 ng/mL Final     Vitamin B-12   Date Value Ref Range Status   07/03/2023 1,024 (H) 211 - 946 pg/mL Final         Hepatitis B Surface Ab Quant   Date Value Ref Range Status   06/26/2023 <3.1 (L) Immunity>9.9 mIU/mL Final     Comment:       Status of Immunity                     Anti-HBs Level    ------------------                     --------------  Inconsistent with Immunity                   0.0 - 9.9  Consistent with Immunity                          >9.9         ASSESSMENT / PLAN  1. Alcoholic cirrhosis of liver without ascites  2. Prolonged INR  3. Thrombocytopenia  Presumed cirrhosis with prolonged INR and thrombocytopenia likely due to cirrhosis. Suspected/presumed cirrhosis following abnormal imaging and labs 6/2023. Avoid alcohol, previously offered resources for assistance to help with abstaining from alcohol intake but he declined   MELD Na 12  calculated during hospital admission 6/2023-->9/13/2023 MELD 3.0 7, MELD Na 8, MELD 8  US q 6 months for HCC screening  ordered today  No Ascites nor Mass identified on CT scan 6/2023 and AFP previously normal  No overt HE   Immune to Hepatitis A, received 3 doses of B vaccine  Recommended daily protein intake of 110 grams per day  Limit salt/sodium intake to no more than 2 grams or 2,000 mg per day.   Avoid NSAIDs such as ibuprofen, Advil, Motrin, diclofenac, meloxicam, naproxen. Limit use of acetaminophen to no more than 2,000 mg per day (patient may take acetaminophen 500 mg q 6 hours as needed for pain).  Avoid alcohol, tobacco and raw shellfish  - CBC (No Diff); Future  - AFP Tumor Marker; Future  - Comprehensive Metabolic Panel; Future  - Protime-INR; Future  - US Liver; Future    4. Encounter for immunization  5. Immune to hepatitis A  - he received 3rd dose of B vaccine today  - Hepatitis B Vaccine Adult IM    6. Secondary esophageal varices without bleeding  - I will message his PCP who prescribes amlodipine for consideration for starting carvedilol BID with patient being prescribed amlodipine. He reports resting heart rate is usually around 57 and his systolic BP is usually around 134. If he starts carvedilol BID (will communicate with him after I receive response from his PCP), resting heart rate should be around 50 to 55, if resting HR is less than 50, notify the office and notify the office if systolic BP is less than 100 or other problems. He demonstrated understanding .  7. Esophageal stenosis  8. Hiatal  hernia  - denies dysphagia, heartburn, reflux; monitor for symptoms, consider PPI if it seems as though he has reflux, heartburn. If he has dysphagia in the future, start PPI and repeat EGD    9. History of colonic polyps  - due for surveillance colonoscopy 11/2026  Return in about 3 months (around 3/29/2024).    Yari Bennett, APRN  12/29/2023

## 2024-01-02 DIAGNOSIS — I85.10 SECONDARY ESOPHAGEAL VARICES WITHOUT BLEEDING: Primary | ICD-10-CM

## 2024-01-02 RX ORDER — CARVEDILOL 3.12 MG/1
3.12 TABLET ORAL 2 TIMES DAILY WITH MEALS
Qty: 180 TABLET | Refills: 3 | Status: SHIPPED | OUTPATIENT
Start: 2024-01-02

## 2024-01-02 NOTE — Clinical Note
Janneth, will you please call Mr. Otto and let him know Dr. Fernandez is okay with patient continuing amlodipine and starting carvedilol 3.125 mg twice daily due to high blood pressure and esophageal varices. Recommend he check blood pressure daily and let our office know if blood pressure is less than 100/60. Also let office know if HR is less than 50. If he does not answer and does not call office back within a day, let me know and I will send him a message. Thanks!

## 2024-01-02 NOTE — PROGRESS NOTES
I received communication from patient's primary care provider that carvedilol twice daily should be okay to add to amlodipine, carvedilol BID prescribed.  Patient has follow-up in 2 weeks with PCP. I will request patient check BP daily with request to notify the office if BP is less than 100/60 or HR is less than 50.

## 2024-01-15 ENCOUNTER — OFFICE VISIT (OUTPATIENT)
Dept: FAMILY MEDICINE CLINIC | Facility: CLINIC | Age: 54
End: 2024-01-15
Payer: COMMERCIAL

## 2024-01-15 VITALS
HEIGHT: 66 IN | BODY MASS INDEX: 28.61 KG/M2 | SYSTOLIC BLOOD PRESSURE: 116 MMHG | OXYGEN SATURATION: 95 % | WEIGHT: 178 LBS | HEART RATE: 85 BPM | DIASTOLIC BLOOD PRESSURE: 74 MMHG

## 2024-01-15 DIAGNOSIS — K70.30 ALCOHOLIC CIRRHOSIS OF LIVER WITHOUT ASCITES: ICD-10-CM

## 2024-01-15 DIAGNOSIS — I10 PRIMARY HYPERTENSION: ICD-10-CM

## 2024-01-15 DIAGNOSIS — M25.511 ACUTE PAIN OF RIGHT SHOULDER: ICD-10-CM

## 2024-01-15 DIAGNOSIS — E78.2 MIXED HYPERLIPIDEMIA: Primary | ICD-10-CM

## 2024-01-15 DIAGNOSIS — Z23 IMMUNIZATION DUE: ICD-10-CM

## 2024-01-15 RX ORDER — CALCIUM CARBONATE/VITAMIN D3 600 MG-10
TABLET ORAL
COMMUNITY
Start: 2023-09-15

## 2024-01-15 RX ORDER — AMLODIPINE BESYLATE 5 MG/1
5 TABLET ORAL DAILY
Qty: 90 TABLET | Refills: 3 | Status: SHIPPED | OUTPATIENT
Start: 2024-01-15

## 2024-01-15 NOTE — LETTER
[facility]  Vaccine Consent Form    Patient Name:  Karthik Otto  Patient :  1970     Vaccine(s) Ordered    Pneumococcal Conjugate Vaccine 20-Valent (PCV20)  Fluzone >6 Months ([id number])        Screening Checklist  The following questions should be completed prior to vaccination. If you answer “yes” to any question, it does not necessarily mean you should not be vaccinated. It just means we may need to clarify or ask more questions. If a question is unclear, please ask your healthcare provider to explain it.    Yes No   Any fever or moderate to severe illness today (mild illness and/or antibiotic treatment are not contraindications)?     Do you have a history of a serious reaction to any previous vaccinations, such as anaphylaxis, encephalopathy within 7 days, Guillain-Shelby syndrome within 6 weeks, seizure?     Have you received any live vaccine(s) (e.g MMR, MERI) or any other vaccines in the last month (to ensure duplicate doses aren't given)?     Do you have an anaphylactic allergy to latex (DTaP, DTaP-IPV, Hep A, Hep B, MenB, RV, Td, Tdap), baker’s yeast (Hep B, HPV), polysorbates (RSV, nirsevimab, PCV 20, Rotavirrus, Tdap, Shingrix), or gelatin (MERI, MMR)?     Do you have an anaphylactic allergy to neomycin (Rabies, MERI, MMR, IPV, Hep A), polymyxin B (IPV), or streptomycin (IPV)?      Any cancer, leukemia, AIDS, or other immune system disorder? (MERI, MMR, RV)     Do you have a parent, brother, or sister with an immune system problem (if immune competence of vaccine recipient clinically verified, can proceed)? (MMR, MERI)     Any recent steroid treatments for >2 weeks, chemotherapy, or radiation treatment? (MERI, MMR)     Have you received antibody-containing blood transfusions or IVIG in the past 11 months (recommended interval is dependent on product)? (MMR, MERI)     Have you taken antiviral drugs (acyclovir, famciclovir, valacyclovir for MERI) in the last 24 or 48 hours, respectively?      Are  "you pregnant or planning to become pregnant within 1 month? (MERI, MMR, HPV, IPV, MenB, Abrexvy; For Hep B- refer to Engerix-B; For RSV - Abrysvo is indicated for 32-36 weeks of pregnancy from September to January)     For infants, have you ever been told your child has had intussusception or a medical emergency involving obstruction of the intestine (Rotavirus)? If not for an infant, can skip this question.         *Ordering Physicians/APC should be consulted if \"yes\" is checked by the patient or guardian above.  I have received, read, and understand the Vaccine Information Statement (VIS) for each vaccine ordered.  I have considered my or my child's health status as well as the health status of my close contacts.  I have taken the opportunity to discuss my vaccine questions with my or my child's health care provider.   I have requested that the ordered vaccine(s) be given to me or my child.  I understand the benefits and risks of the vaccines.  I understand that I should remain in the clinic for 15 minutes after receiving the vaccine(s).  _________________________________________________________  Signature of Patient or Parent/Legal Guardian ____________________  Date     "

## 2024-01-15 NOTE — PROGRESS NOTES
Established Office Visit      Patient Name: Karthik Otto  : 1970   MRN: [id number]   Care Team: Patient Care Team:  Akila Fernandez DO as PCP - General (Internal Medicine)  Rahul Morris MD as Consulting Physician (Hematology and Oncology)    Chief Complaint:    Chief Complaint   Patient presents with    Hypertension    Hyperlipidemia       History of Present Illness: Karthik Otto is a 53 y.o. male who is here today to follow up with HTN, HLD.    Right shoulder pain - ongoing since November after falling off a later. Not taking anything OTC to help with pain. Has improved over the past few months but exacerbated by throwing frisbee. Describes pain on anterior and superior shoulder. No radiation. No weakness. No numbness/tingling. He participates in competitive dog shows which flares pain.    HTN - compliant with with coreg and amlodipine. Denies side effects. 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 This has not seemed to change since adding coreg.       Subjective      Review of Systems:   Review of Systems - See HPI    I have reviewed and the following portions of the patient's history were updated as appropriate: past family history, past medical history, past social history, past surgical history and problem list.    Medications:     Current Outpatient Medications:     amLODIPine (NORVASC) 5 MG tablet, Take 1 tablet by mouth Daily., Disp: 90 tablet, Rfl: 3    calcium carb-cholecalciferol (Calcium 600+D) 600-10 MG-MCG tablet per tablet, , Disp: , Rfl:     carvedilol (Coreg) 3.125 MG tablet, Take 1 tablet by mouth 2 (Two) Times a Day With Meals., Disp: 180 tablet, Rfl: 3    multivitamin with minerals tablet tablet, Take 1 tablet by mouth Daily., Disp: 30 each, Rfl: 11    Allergies:   No Known Allergies    Objective     Physical Exam:  Vital Signs:   Vitals:    01/15/24 1416   BP: 116/74   Pulse: 85   SpO2: 95%   Weight: 80.7 kg (178 lb)  "  Height: 167.6 cm (66\")     Body mass index is 28.73 kg/m².     Physical Exam  Vitals reviewed.   Constitutional:       Appearance: Normal appearance.   Cardiovascular:      Rate and Rhythm: Normal rate and regular rhythm.   Pulmonary:      Effort: Pulmonary effort is normal. No respiratory distress.   Musculoskeletal:         General: No swelling. Normal range of motion.   Neurological:      Mental Status: He is alert.   Psychiatric:         Mood and Affect: Mood normal.         Behavior: Behavior normal.         Judgment: Judgment normal.         Assessment / Plan      Assessment/Plan:   Problems Addressed This Visit  Diagnoses and all orders for this visit:    1. Mixed hyperlipidemia (Primary)  -     Lipid Panel w/ Chol/HDL Ratio; Future    2. Primary hypertension  -     amLODIPine (NORVASC) 5 MG tablet; Take 1 tablet by mouth Daily.  Dispense: 90 tablet; Refill: 3  Well controlled, continue amlodipine and coreg. Discussed holding coreg for HR <55    3. Acute pain of right shoulder  -     Ambulatory Referral to Physical Therapy Evaluate and treat  If symptoms persist- will move forward with imaging and consider ortho referral. Encouraged him to let me know and he will return if symptoms do not resolve with PT    4. Immunization due  -     Fluzone >6 Months ([id number])  -     Pneumococcal Conjugate Vaccine 20-Valent (PCV20)    5. Alcoholic cirrhosis of liver without ascites  Following with GI  Continue complete sobriety       Plan of care reviewed with patient at the conclusion of today's visit. Education was provided regarding diagnosis and management.  Patient verbalizes understanding of and agreement with management plan.    Follow Up:   No follow-ups on file.        DO MARIFER Romo RD  [facility]  [address]  [facility] [address]  Fax [phone]  Phone [phone]      "

## 2024-01-25 ENCOUNTER — LAB (OUTPATIENT)
Dept: LAB | Facility: HOSPITAL | Age: 54
End: 2024-01-25
Payer: COMMERCIAL

## 2024-01-25 DIAGNOSIS — R79.1 PROLONGED INR: ICD-10-CM

## 2024-01-25 DIAGNOSIS — D69.6 THROMBOCYTOPENIA: ICD-10-CM

## 2024-01-25 DIAGNOSIS — E78.2 MIXED HYPERLIPIDEMIA: ICD-10-CM

## 2024-01-25 DIAGNOSIS — K70.30 ALCOHOLIC CIRRHOSIS OF LIVER WITHOUT ASCITES: ICD-10-CM

## 2024-01-25 LAB
ALBUMIN SERPL-MCNC: 4.5 G/DL (ref 3.5–5.2)
ALBUMIN/GLOB SERPL: 1.5 G/DL
ALP SERPL-CCNC: 107 U/L (ref 39–117)
ALPHA-FETOPROTEIN: <2 NG/ML (ref 0–8.3)
ALT SERPL W P-5'-P-CCNC: 27 U/L (ref 1–41)
ANION GAP SERPL CALCULATED.3IONS-SCNC: 11.2 MMOL/L (ref 5–15)
AST SERPL-CCNC: 30 U/L (ref 1–40)
BILIRUB SERPL-MCNC: 1 MG/DL (ref 0–1.2)
BUN SERPL-MCNC: 14 MG/DL (ref 6–20)
BUN/CREAT SERPL: 13.9 (ref 7–25)
CALCIUM SPEC-SCNC: 10.1 MG/DL (ref 8.6–10.5)
CHLORIDE SERPL-SCNC: 104 MMOL/L (ref 98–107)
CHOLEST SERPL-MCNC: 242 MG/DL (ref 0–200)
CO2 SERPL-SCNC: 23.8 MMOL/L (ref 22–29)
CREAT SERPL-MCNC: 1.01 MG/DL (ref 0.76–1.27)
DEPRECATED RDW RBC AUTO: 43 FL (ref 37–54)
EGFRCR SERPLBLD CKD-EPI 2021: 90 ML/MIN/1.73
ERYTHROCYTE [DISTWIDTH] IN BLOOD BY AUTOMATED COUNT: 13.5 % (ref 12.3–15.4)
GLOBULIN UR ELPH-MCNC: 3 GM/DL
GLUCOSE SERPL-MCNC: 103 MG/DL (ref 65–99)
HCT VFR BLD AUTO: 44.4 % (ref 37.5–51)
HDLC SERPL QL: 3.27
HDLC SERPL-MCNC: 74 MG/DL (ref 40–60)
HGB BLD-MCNC: 14.5 G/DL (ref 13–17.7)
INR PPP: 0.99 (ref 0.89–1.12)
LDLC SERPL CALC-MCNC: 154 MG/DL (ref 0–100)
MCH RBC QN AUTO: 28.8 PG (ref 26.6–33)
MCHC RBC AUTO-ENTMCNC: 32.7 G/DL (ref 31.5–35.7)
MCV RBC AUTO: 88.3 FL (ref 79–97)
PLATELET # BLD AUTO: 146 10*3/MM3 (ref 140–450)
PMV BLD AUTO: 12.3 FL (ref 6–12)
POTASSIUM SERPL-SCNC: 4.5 MMOL/L (ref 3.5–5.2)
PROT SERPL-MCNC: 7.5 G/DL (ref 6–8.5)
PROTHROMBIN TIME: 13.1 SECONDS (ref 12.2–14.5)
RBC # BLD AUTO: 5.03 10*6/MM3 (ref 4.14–5.8)
SODIUM SERPL-SCNC: 139 MMOL/L (ref 136–145)
TRIGL SERPL-MCNC: 80 MG/DL (ref 0–150)
VLDLC SERPL-MCNC: 14 MG/DL (ref 5–40)
WBC NRBC COR # BLD AUTO: 5.99 10*3/MM3 (ref 3.4–10.8)

## 2024-01-25 PROCEDURE — 80053 COMPREHEN METABOLIC PANEL: CPT

## 2024-01-25 PROCEDURE — 85610 PROTHROMBIN TIME: CPT

## 2024-01-25 PROCEDURE — 80061 LIPID PANEL: CPT

## 2024-01-25 PROCEDURE — 82105 ALPHA-FETOPROTEIN SERUM: CPT

## 2024-01-25 PROCEDURE — 85027 COMPLETE CBC AUTOMATED: CPT

## 2024-02-02 ENCOUNTER — APPOINTMENT (OUTPATIENT)
Dept: ULTRASOUND IMAGING | Facility: HOSPITAL | Age: 54
End: 2024-02-02
Payer: COMMERCIAL

## 2024-02-28 ENCOUNTER — HOSPITAL ENCOUNTER (OUTPATIENT)
Dept: ULTRASOUND IMAGING | Facility: HOSPITAL | Age: 54
End: 2024-02-28
Payer: COMMERCIAL

## 2024-03-01 ENCOUNTER — HOSPITAL ENCOUNTER (OUTPATIENT)
Dept: ULTRASOUND IMAGING | Facility: HOSPITAL | Age: 54
Discharge: HOME | End: 2024-03-01
Admitting: NURSE PRACTITIONER
Payer: COMMERCIAL

## 2024-03-01 DIAGNOSIS — K70.30 ALCOHOLIC CIRRHOSIS OF LIVER WITHOUT ASCITES: ICD-10-CM

## 2024-03-01 PROCEDURE — 76705 ECHO EXAM OF ABDOMEN: CPT

## 2025-01-02 DIAGNOSIS — I85.10 SECONDARY ESOPHAGEAL VARICES WITHOUT BLEEDING: ICD-10-CM

## 2025-01-02 RX ORDER — CARVEDILOL 3.12 MG/1
3.12 TABLET ORAL 2 TIMES DAILY WITH MEALS
Qty: 180 TABLET | Refills: 3 | OUTPATIENT
Start: 2025-01-02

## 2025-02-27 DIAGNOSIS — I10 PRIMARY HYPERTENSION: ICD-10-CM

## 2025-02-27 RX ORDER — AMLODIPINE BESYLATE 5 MG/1
5 TABLET ORAL DAILY
Qty: 90 TABLET | Refills: 0 | Status: SHIPPED | OUTPATIENT
Start: 2025-02-27

## 2025-02-27 NOTE — TELEPHONE ENCOUNTER
Rx Refill Note  Requested Prescriptions     Pending Prescriptions Disp Refills    amLODIPine (NORVASC) 5 MG tablet [Pharmacy Med Name: AMLODIPINE BESYLATE 5 MG TAB] 90 tablet 3     Sig: TAKE 1 TABLET BY MOUTH EVERY DAY      Last office visit with prescribing clinician: 1/15/2024   Last telemedicine visit with prescribing clinician: Visit date not found   Next office visit with prescribing clinician: Visit date not found                         Would you like a call back once the refill request has been completed: [] Yes [] No    If the office needs to give you a call back, can they leave a voicemail: [] Yes [] No    Avelina Bailey MA  02/27/25, 09:03 EST

## 2025-05-25 DIAGNOSIS — I10 PRIMARY HYPERTENSION: ICD-10-CM

## 2025-05-27 RX ORDER — AMLODIPINE BESYLATE 5 MG/1
5 TABLET ORAL DAILY
Qty: 90 TABLET | Refills: 0 | OUTPATIENT
Start: 2025-05-27

## 2025-05-27 NOTE — TELEPHONE ENCOUNTER
Rx Refill Note  Requested Prescriptions     Pending Prescriptions Disp Refills    amLODIPine (NORVASC) 5 MG tablet [Pharmacy Med Name: AMLODIPINE BESYLATE 5 MG TAB] 90 tablet 0     Sig: TAKE 1 TABLET BY MOUTH EVERY DAY      Last office visit with prescribing clinician: 1/15/2024   Last telemedicine visit with prescribing clinician: Visit date not found   Next office visit with prescribing clinician: Visit date not found       Jade Kimbrough MA  05/27/25, 14:01 EDT